# Patient Record
Sex: MALE | Race: WHITE | NOT HISPANIC OR LATINO | URBAN - METROPOLITAN AREA
[De-identification: names, ages, dates, MRNs, and addresses within clinical notes are randomized per-mention and may not be internally consistent; named-entity substitution may affect disease eponyms.]

---

## 2017-06-05 ENCOUNTER — OUTPATIENT (OUTPATIENT)
Dept: OUTPATIENT SERVICES | Facility: HOSPITAL | Age: 27
LOS: 1 days | End: 2017-06-05
Payer: COMMERCIAL

## 2017-06-05 VITALS
WEIGHT: 199.08 LBS | SYSTOLIC BLOOD PRESSURE: 128 MMHG | HEART RATE: 64 BPM | DIASTOLIC BLOOD PRESSURE: 79 MMHG | TEMPERATURE: 98 F | RESPIRATION RATE: 18 BRPM | HEIGHT: 68 IN

## 2017-06-05 DIAGNOSIS — D21.21 BENIGN NEOPLASM OF CONNECTIVE AND OTHER SOFT TISSUE OF RIGHT LOWER LIMB, INCLUDING HIP: ICD-10-CM

## 2017-06-05 DIAGNOSIS — D21.4 BENIGN NEOPLASM OF CONNECTIVE AND OTHER SOFT TISSUE OF ABDOMEN: ICD-10-CM

## 2017-06-05 DIAGNOSIS — Z01.818 ENCOUNTER FOR OTHER PREPROCEDURAL EXAMINATION: ICD-10-CM

## 2017-06-05 DIAGNOSIS — Z90.89 ACQUIRED ABSENCE OF OTHER ORGANS: Chronic | ICD-10-CM

## 2017-06-05 DIAGNOSIS — D17.23 BENIGN LIPOMATOUS NEOPLASM OF SKIN AND SUBCUTANEOUS TISSUE OF RIGHT LEG: ICD-10-CM

## 2017-06-05 DIAGNOSIS — Z98.89 OTHER SPECIFIED POSTPROCEDURAL STATES: Chronic | ICD-10-CM

## 2017-06-05 LAB
HCT VFR BLD CALC: 44 % — SIGNIFICANT CHANGE UP (ref 39–50)
HGB BLD-MCNC: 15 G/DL — SIGNIFICANT CHANGE UP (ref 13–17)
MCHC RBC-ENTMCNC: 29.7 PG — SIGNIFICANT CHANGE UP (ref 27–34)
MCHC RBC-ENTMCNC: 34 GM/DL — SIGNIFICANT CHANGE UP (ref 32–36)
MCV RBC AUTO: 87.3 FL — SIGNIFICANT CHANGE UP (ref 80–100)
PLATELET # BLD AUTO: 169 K/UL — SIGNIFICANT CHANGE UP (ref 150–400)
RBC # BLD: 5.04 M/UL — SIGNIFICANT CHANGE UP (ref 4.2–5.8)
RBC # FLD: 12.3 % — SIGNIFICANT CHANGE UP (ref 10.3–14.5)
WBC # BLD: 8.4 K/UL — SIGNIFICANT CHANGE UP (ref 3.8–10.5)
WBC # FLD AUTO: 8.4 K/UL — SIGNIFICANT CHANGE UP (ref 3.8–10.5)

## 2017-06-05 PROCEDURE — 85027 COMPLETE CBC AUTOMATED: CPT

## 2017-06-05 PROCEDURE — 87389 HIV-1 AG W/HIV-1&-2 AB AG IA: CPT

## 2017-06-05 PROCEDURE — G0463: CPT

## 2017-06-05 NOTE — H&P PST ADULT - ASSESSMENT
25 y/o healthy male with the c/o of lipomas on the right thigh and one on the abdominal wall, now scheduled to have them removed. Pre op testing  today. No medical eval necessary.

## 2017-06-05 NOTE — H&P PST ADULT - RS GEN PE MLT RESP DETAILS PC
breath sounds equal/normal/clear to auscultation bilaterally/respirations non-labored/good air movement/airway patent

## 2017-06-05 NOTE — H&P PST ADULT - NSANTHOSAYNRD_GEN_A_CORE
No. GONZALO screening performed.  STOP BANG Legend: 0-2 = LOW Risk; 3-4 = INTERMEDIATE Risk; 5-8 = HIGH Risk

## 2017-06-05 NOTE — H&P PST ADULT - PSH
H/O removal of cyst  lipoma removal 2013 H/O removal of cyst  lipoma removal 2013  S/P tonsillectomy

## 2017-06-05 NOTE — H&P PST ADULT - HISTORY OF PRESENT ILLNESS
27 y/o male with c/o small lumps on the abdomen and both thighs. Denies any pain or discomfort, was seen by Dr Knowles, advised to have excision. 25 y/o male with c/o small lumps on the abdomen and right thigh. Denies any pain or discomfort, was seen by Dr Knowles, advised to have excision of all 3 lumps. Had similar surgery in 2013. Denies any pain , however the one on the abdomen bothers him when wearing the vest (). Pre testing today.

## 2017-06-06 ENCOUNTER — TRANSCRIPTION ENCOUNTER (OUTPATIENT)
Age: 27
End: 2017-06-06

## 2017-06-06 LAB — HIV 1+2 AB+HIV1 P24 AG SERPL QL IA: SIGNIFICANT CHANGE UP

## 2017-06-06 RX ORDER — SODIUM CHLORIDE 9 MG/ML
1000 INJECTION, SOLUTION INTRAVENOUS
Qty: 0 | Refills: 0 | Status: DISCONTINUED | OUTPATIENT
Start: 2017-06-07 | End: 2017-06-07

## 2017-06-07 ENCOUNTER — OUTPATIENT (OUTPATIENT)
Dept: OUTPATIENT SERVICES | Facility: HOSPITAL | Age: 27
LOS: 1 days | Discharge: HOME | End: 2017-06-07
Payer: COMMERCIAL

## 2017-06-07 VITALS
SYSTOLIC BLOOD PRESSURE: 118 MMHG | RESPIRATION RATE: 12 BRPM | HEIGHT: 68 IN | TEMPERATURE: 98 F | OXYGEN SATURATION: 95 % | HEART RATE: 65 BPM | DIASTOLIC BLOOD PRESSURE: 61 MMHG | WEIGHT: 199.08 LBS

## 2017-06-07 VITALS
DIASTOLIC BLOOD PRESSURE: 59 MMHG | HEART RATE: 73 BPM | SYSTOLIC BLOOD PRESSURE: 125 MMHG | RESPIRATION RATE: 16 BRPM | TEMPERATURE: 98 F | OXYGEN SATURATION: 98 %

## 2017-06-07 DIAGNOSIS — D21.4 BENIGN NEOPLASM OF CONNECTIVE AND OTHER SOFT TISSUE OF ABDOMEN: ICD-10-CM

## 2017-06-07 DIAGNOSIS — D21.21 BENIGN NEOPLASM OF CONNECTIVE AND OTHER SOFT TISSUE OF RIGHT LOWER LIMB, INCLUDING HIP: ICD-10-CM

## 2017-06-07 DIAGNOSIS — Z90.89 ACQUIRED ABSENCE OF OTHER ORGANS: Chronic | ICD-10-CM

## 2017-06-07 DIAGNOSIS — Z98.89 OTHER SPECIFIED POSTPROCEDURAL STATES: Chronic | ICD-10-CM

## 2017-06-07 DIAGNOSIS — Z01.818 ENCOUNTER FOR OTHER PREPROCEDURAL EXAMINATION: ICD-10-CM

## 2017-06-07 PROCEDURE — 88304 TISSUE EXAM BY PATHOLOGIST: CPT | Mod: 26

## 2017-06-07 PROCEDURE — 27339 EXC THIGH/KNEE TUM DEP 5CM/>: CPT

## 2017-06-07 PROCEDURE — 22901 EXC ABDL TUM DEEP 5 CM/>: CPT

## 2017-06-07 PROCEDURE — 88304 TISSUE EXAM BY PATHOLOGIST: CPT

## 2017-06-07 RX ORDER — SODIUM CHLORIDE 9 MG/ML
1000 INJECTION, SOLUTION INTRAVENOUS
Qty: 0 | Refills: 0 | Status: DISCONTINUED | OUTPATIENT
Start: 2017-06-07 | End: 2017-06-07

## 2017-06-07 RX ORDER — HYDROMORPHONE HYDROCHLORIDE 2 MG/ML
0.5 INJECTION INTRAMUSCULAR; INTRAVENOUS; SUBCUTANEOUS
Qty: 0 | Refills: 0 | Status: DISCONTINUED | OUTPATIENT
Start: 2017-06-07 | End: 2017-06-07

## 2017-06-07 RX ORDER — MEPERIDINE HYDROCHLORIDE 50 MG/ML
12.5 INJECTION INTRAMUSCULAR; INTRAVENOUS; SUBCUTANEOUS
Qty: 0 | Refills: 0 | Status: DISCONTINUED | OUTPATIENT
Start: 2017-06-07 | End: 2017-06-07

## 2017-06-07 RX ORDER — OXYCODONE HYDROCHLORIDE 5 MG/1
5 TABLET ORAL EVERY 4 HOURS
Qty: 0 | Refills: 0 | Status: DISCONTINUED | OUTPATIENT
Start: 2017-06-07 | End: 2017-06-07

## 2017-06-07 RX ORDER — HYDROCODONE BITARTRATE AND ACETAMINOPHEN 7.5; 325 MG/15ML; MG/15ML
1 SOLUTION ORAL
Qty: 10 | Refills: 0
Start: 2017-06-07

## 2017-06-07 RX ORDER — ONDANSETRON 8 MG/1
4 TABLET, FILM COATED ORAL ONCE
Qty: 0 | Refills: 0 | Status: DISCONTINUED | OUTPATIENT
Start: 2017-06-07 | End: 2017-06-07

## 2017-06-07 RX ORDER — METOCLOPRAMIDE HCL 10 MG
10 TABLET ORAL ONCE
Qty: 0 | Refills: 0 | Status: DISCONTINUED | OUTPATIENT
Start: 2017-06-07 | End: 2017-06-07

## 2017-06-07 RX ORDER — CEFAZOLIN SODIUM 1 G
1000 VIAL (EA) INJECTION ONCE
Qty: 0 | Refills: 0 | Status: COMPLETED | OUTPATIENT
Start: 2017-06-07 | End: 2017-06-07

## 2017-06-07 RX ADMIN — SODIUM CHLORIDE 40 MILLILITER(S): 9 INJECTION, SOLUTION INTRAVENOUS at 13:20

## 2017-06-07 NOTE — BRIEF OPERATIVE NOTE - POST-OP DX
Soft tissue mass  06/07/2017  Right Anterior Thigh Mass. Right Posterior Thigh Mass, Left Abdominal Wall Mass  Active  Edy Knowles

## 2017-06-07 NOTE — BRIEF OPERATIVE NOTE - COMMENTS
Excision Right Anterior Thigh Mass. Excison Right Posterior Thigh Mass, Excision Left Abdominal Wall Mass

## 2017-06-07 NOTE — ASU DISCHARGE PLAN (ADULT/PEDIATRIC). - ASU FOLLOWUP
888 or go to the nearest Emergency Room[phone]

## 2017-06-07 NOTE — ASU DISCHARGE PLAN (ADULT/PEDIATRIC). - MEDICATION SUMMARY - MEDICATIONS TO TAKE
I will START or STAY ON the medications listed below when I get home from the hospital:    acetaminophen-hydrocodone 325 mg-5 mg oral tablet  -- 1 tab(s) by mouth every 6 hours, As Needed -for severe pain MDD:4  -- Caution federal law prohibits the transfer of this drug to any person other  than the person for whom it was prescribed.  May cause drowsiness.  Alcohol may intensify this effect.  Use care when operating dangerous machinery.  This product contains acetaminophen.  Do not use  with any other product containing acetaminophen to prevent possible liver damage.  Using more of this medication than prescribed may cause serious breathing problems.    -- Indication: For pain

## 2017-06-09 LAB — SURGICAL PATHOLOGY FINAL REPORT - CH: SIGNIFICANT CHANGE UP

## 2017-06-12 DIAGNOSIS — D21.21 BENIGN NEOPLASM OF CONNECTIVE AND OTHER SOFT TISSUE OF RIGHT LOWER LIMB, INCLUDING HIP: ICD-10-CM

## 2017-06-12 DIAGNOSIS — F17.210 NICOTINE DEPENDENCE, CIGARETTES, UNCOMPLICATED: ICD-10-CM

## 2020-08-31 ENCOUNTER — TRANSCRIPTION ENCOUNTER (OUTPATIENT)
Age: 30
End: 2020-08-31

## 2022-02-09 ENCOUNTER — EMERGENCY (EMERGENCY)
Facility: HOSPITAL | Age: 32
LOS: 1 days | Discharge: HOME | End: 2022-02-09
Attending: EMERGENCY MEDICINE
Payer: COMMERCIAL

## 2022-02-09 VITALS
HEIGHT: 68 IN | DIASTOLIC BLOOD PRESSURE: 95 MMHG | RESPIRATION RATE: 18 BRPM | WEIGHT: 197.98 LBS | TEMPERATURE: 98 F | SYSTOLIC BLOOD PRESSURE: 146 MMHG | HEART RATE: 82 BPM | OXYGEN SATURATION: 97 %

## 2022-02-09 VITALS
OXYGEN SATURATION: 99 % | RESPIRATION RATE: 18 BRPM | HEART RATE: 78 BPM | DIASTOLIC BLOOD PRESSURE: 92 MMHG | TEMPERATURE: 98 F | SYSTOLIC BLOOD PRESSURE: 146 MMHG

## 2022-02-09 DIAGNOSIS — Z90.89 ACQUIRED ABSENCE OF OTHER ORGANS: Chronic | ICD-10-CM

## 2022-02-09 DIAGNOSIS — Z98.89 OTHER SPECIFIED POSTPROCEDURAL STATES: Chronic | ICD-10-CM

## 2022-02-09 PROCEDURE — 73030 X-RAY EXAM OF SHOULDER: CPT

## 2022-02-09 PROCEDURE — 73110 X-RAY EXAM OF WRIST: CPT

## 2022-02-09 PROCEDURE — 73110 X-RAY EXAM OF WRIST: CPT | Mod: 26,RT

## 2022-02-09 PROCEDURE — 73020 X-RAY EXAM OF SHOULDER: CPT

## 2022-02-09 PROCEDURE — 73630 X-RAY EXAM OF FOOT: CPT

## 2022-02-09 PROCEDURE — 73030 X-RAY EXAM OF SHOULDER: CPT | Mod: 26,RT

## 2022-02-09 PROCEDURE — 99284 EMERGENCY DEPT VISIT MOD MDM: CPT

## 2022-02-09 PROCEDURE — 99285 EMERGENCY DEPT VISIT HI MDM: CPT | Mod: 25

## 2022-02-09 PROCEDURE — 99053 MED SERV 10PM-8AM 24 HR FAC: CPT

## 2022-02-09 PROCEDURE — 73630 X-RAY EXAM OF FOOT: CPT | Mod: 26,RT

## 2022-02-09 RX ORDER — ACETAMINOPHEN 500 MG
975 TABLET ORAL ONCE
Refills: 0 | Status: COMPLETED | OUTPATIENT
Start: 2022-02-09 | End: 2022-02-09

## 2022-02-09 RX ORDER — LIDOCAINE 4 G/100G
1 CREAM TOPICAL ONCE
Refills: 0 | Status: COMPLETED | OUTPATIENT
Start: 2022-02-09 | End: 2022-02-09

## 2022-02-09 RX ORDER — METHOCARBAMOL 500 MG/1
750 TABLET, FILM COATED ORAL ONCE
Refills: 0 | Status: COMPLETED | OUTPATIENT
Start: 2022-02-09 | End: 2022-02-09

## 2022-02-09 RX ORDER — IBUPROFEN 200 MG
600 TABLET ORAL ONCE
Refills: 0 | Status: COMPLETED | OUTPATIENT
Start: 2022-02-09 | End: 2022-02-09

## 2022-02-09 RX ADMIN — METHOCARBAMOL 750 MILLIGRAM(S): 500 TABLET, FILM COATED ORAL at 04:14

## 2022-02-09 RX ADMIN — Medication 975 MILLIGRAM(S): at 04:13

## 2022-02-09 RX ADMIN — Medication 600 MILLIGRAM(S): at 04:13

## 2022-02-09 RX ADMIN — LIDOCAINE 1 PATCH: 4 CREAM TOPICAL at 04:14
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
## 2022-02-09 NOTE — ED PROVIDER NOTE - NSFOLLOWUPCLINICS_GEN_ALL_ED_FT
[facility] Orthopedic Surgery  Orthopedic Surgery  [address], 3rd & 4th [place] [address]  Phone: [phone]  Fax:
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
## 2022-02-09 NOTE — ED PROVIDER NOTE - PATIENT PORTAL LINK FT
You can access the FollowMyHealth Patient Portal offered by [facility] by registering at the following website: [email or web address]. By joining [facility]’s FollowMyHealth portal, you will also be able to view your health information using other applications (apps) compatible with our system.
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
## 2022-02-09 NOTE — ED PROVIDER NOTE - MUSCULOSKELETAL, MLM
Spine appears normal, no midline c/t/l spine ttp, stable pelvis, with R shoulder ttp specifically over the coricoid process, able to range shoulder but with discomfort. posterior ttp of the R wrist, no swelling or bruising noted. neurovascularly intact. no medial or lateral malleous ttp of the R ankle. ttp over the navicular.

## 2022-02-09 NOTE — ED PROVIDER NOTE - PROGRESS NOTE DETAILS
kateryna hale pgy3: negative XR, reports feeling better at this time and would like to be discharged. will dc home with supportive care instructions and ortho follow up.

## 2022-04-05 ENCOUNTER — APPOINTMENT (OUTPATIENT)
Dept: MRI IMAGING | Facility: CLINIC | Age: 32
End: 2022-04-05
Payer: OTHER MISCELLANEOUS

## 2022-04-05 PROBLEM — Z00.00 ENCOUNTER FOR PREVENTIVE HEALTH EXAMINATION: Status: ACTIVE | Noted: 2022-04-05

## 2022-04-05 PROCEDURE — 99072 ADDL SUPL MATRL&STAF TM PHE: CPT

## 2022-04-05 PROCEDURE — 73721 MRI JNT OF LWR EXTRE W/O DYE: CPT | Mod: RT

## 2022-04-13 ENCOUNTER — APPOINTMENT (OUTPATIENT)
Dept: ORTHOPEDIC SURGERY | Facility: CLINIC | Age: 32
End: 2022-04-13
Payer: OTHER MISCELLANEOUS

## 2022-04-13 VITALS — BODY MASS INDEX: 29.1 KG/M2 | HEIGHT: 68 IN | WEIGHT: 192 LBS

## 2022-04-13 DIAGNOSIS — Z78.9 OTHER SPECIFIED HEALTH STATUS: ICD-10-CM

## 2022-04-13 PROCEDURE — 99214 OFFICE O/P EST MOD 30 MIN: CPT

## 2022-04-13 PROCEDURE — 99072 ADDL SUPL MATRL&STAF TM PHE: CPT

## 2022-04-13 NOTE — DATA REVIEWED
[MRI] : MRI [Right] : of the right [Shoulder] : shoulder [FreeTextEntry1] : 2/22/22 [facility]: 1. Mild AC joint arthrosis with capsule hypertrophy. Lateral downsloping acromion which may be causing impingement.\par 2. Tendinopathy and bursal surface fraying of the supraspinatus and infraspinatus tendons.\par 3. Tear of the superior and anterior labrum.

## 2022-04-13 NOTE — PHYSICAL EXAM
[de-identified] : Constitutional: The general appearance of the patient is well developed, well nourished, no deformities and well groomed. Normal \par \par Gait: Gait and function is as follows: normal gait. \par \par Skin: Head and neck visualized skin is normal. Left upper extremity visualized skin is normal. Right upper extremity visualized skin is normal. Thoracic Skin of the thoracic spine shows visualized skin is normal. \par \par Cardiovascular: palpable radial pulse bilaterally, good capillary refill in digits of the bilateral upper extremities and no temperature or color changes in the bilateral upper extremities. \par \par Lymphatic: Normal Palpation of lymph nodes in the cervical. \par \par Neurologic: fine motor control in the bilateral upper extremities is intact. Deep Tendon Reflexes in Upper and Lower Extremities Negative Kelsey's in the bilateral upper extremities. The patient is oriented to time, place and person. Sensation to light touch intact in the bilateral upper extremities. Mood and Affect is normal. \par \par Right Shoulder: Inspection of the shoulder/upper arm is as follows: no scapula winging, no biceps deformity and no AC joint deformity. Palpation of the shoulder/upper arm is as follows: There is tenderness at the proximal biceps tendon. Range of motion of the shoulder is as follows: Pain with internal rotation, external rotation, abduction and forward flexion. Strength of the shoulder is as follows: Supraspinatus 4/5. External Rotation 4/5. Internal Rotation 4/5. Deltoid 5/5 Ligament Stability and Special Tests of the shoulder is as follows: Neer test is positive. Shannon' test is positive. Speed's test is positive. \par \par Left Shoulder: Inspection of the shoulder/upper arm is as follows: There is a full, pain-free, stable range of motion of the shoulder with normal strength and no tenderness to palpation. \par \par Neck: \par Inspection / Palpation of the cervical spine is as follows: There is a full, pain free, stable range of motion of the cervical spine with normal tone and no tenderness to palpation. \par \par Back, including spine: Inspection / Palpation of the thoracic/lumbar spine is as follows: There is a full, pain free, stable range of motion of the thoracic spine with a normal tone and not tenderness to palpation..\par
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
## 2022-04-14 ENCOUNTER — APPOINTMENT (OUTPATIENT)
Dept: ORTHOPEDIC SURGERY | Facility: CLINIC | Age: 32
End: 2022-04-14
Payer: OTHER MISCELLANEOUS

## 2022-04-14 VITALS — BODY MASS INDEX: 29.1 KG/M2 | WEIGHT: 192 LBS | HEIGHT: 68 IN

## 2022-04-14 DIAGNOSIS — S93.491A SPRAIN OF OTHER LIGAMENT OF RIGHT ANKLE, INITIAL ENCOUNTER: ICD-10-CM

## 2022-04-14 PROCEDURE — 99214 OFFICE O/P EST MOD 30 MIN: CPT | Mod: 1L

## 2022-04-14 PROCEDURE — 99072 ADDL SUPL MATRL&STAF TM PHE: CPT

## 2022-04-14 PROCEDURE — 99214 OFFICE O/P EST MOD 30 MIN: CPT

## 2022-04-14 PROCEDURE — 99072 ADDL SUPL MATRL&STAF TM PHE: CPT | Mod: 1L

## 2022-04-14 NOTE — WORK
[Partial] : partial [Does not reveal pre-existing condition(s) that may affect treatment/prognosis] : does not reveal pre-existing condition(s) that may affect treatment/prognosis [Can return to work without limitations on ______] : can return to work without limitations on [unfilled] [N/A] : : Not Applicable [Patient] : patient [No Rx restrictions] : No Rx restrictions. [I provided the services listed above] :  I provided the services listed above. [FreeTextEntry1] : good

## 2022-04-14 NOTE — DATA REVIEWED
[MRI] : MRI [Right] : of the right [I reviewed the films/CD and additionally noted] : I reviewed the films/CD and additionally noted [FreeTextEntry1] : healing posterior mall fx - peroneal tear. syndesmotic sprain
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
## 2022-04-14 NOTE — ASSESSMENT
[FreeTextEntry1] : wbat\par PT\par nsaids prn\par brace\par working full time\par discussed surgical treatment - will monitor response to PT\par f/up 6 wks

## 2022-04-14 NOTE — PHYSICAL EXAM
[Right] : right foot and ankle [NL (40)] : plantar flexion 40 degrees [NL 30)] : inversion 30 degrees [5___] : eversion 5[unfilled]/5 [2+] : dorsalis pedis pulse: 2+ [] : patient ambulates without assistive device [de-identified] : popping on eversion [de-identified] : eversion 15 degrees [TWNoteComboBox7] : dorsiflexion 15 degrees

## 2022-06-01 ENCOUNTER — APPOINTMENT (OUTPATIENT)
Dept: ORTHOPEDIC SURGERY | Facility: CLINIC | Age: 32
End: 2022-06-01
Payer: OTHER MISCELLANEOUS

## 2022-06-01 VITALS — BODY MASS INDEX: 29.1 KG/M2 | HEIGHT: 68 IN | WEIGHT: 192 LBS

## 2022-06-01 PROCEDURE — 99214 OFFICE O/P EST MOD 30 MIN: CPT

## 2022-06-01 PROCEDURE — 99072 ADDL SUPL MATRL&STAF TM PHE: CPT

## 2022-06-01 NOTE — ASSESSMENT
[FreeTextEntry1] : wbat\par again rec PT\par nsaids prn\par brace for activity\par working full time\par discussed surgical treatment - will monitor response to PT\par f/up 6 wks
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
## 2022-07-20 ENCOUNTER — APPOINTMENT (OUTPATIENT)
Dept: ORTHOPEDIC SURGERY | Facility: CLINIC | Age: 32
End: 2022-07-20

## 2022-07-20 VITALS — HEIGHT: 68 IN | WEIGHT: 192 LBS | BODY MASS INDEX: 29.1 KG/M2

## 2022-07-20 PROCEDURE — 99213 OFFICE O/P EST LOW 20 MIN: CPT

## 2022-07-20 PROCEDURE — 99072 ADDL SUPL MATRL&STAF TM PHE: CPT
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
## 2022-07-20 NOTE — PHYSICAL EXAM
[Right] : right foot and ankle [NL (40)] : plantar flexion 40 degrees [NL 30)] : inversion 30 degrees [5___] : eversion 5[unfilled]/5 [2+] : dorsalis pedis pulse: 2+ [NL (20)] : eversion 20 degrees [] : negative anterior drawer at ankle [FreeTextEntry8] : improved [de-identified] : popping on eversion [de-identified] : eversion 15 degrees [TWNoteComboBox7] : dorsiflexion 15 degrees

## 2022-07-20 NOTE — ASSESSMENT
[FreeTextEntry1] : wbat\par continue PT\par nsaids prn\par brace for activity\par working full time\par f/up 8 wks

## 2022-08-22 NOTE — H&P PST ADULT - NEGATIVE GENERAL SYMPTOMS
checked, Clonazepam refilled (#90,0R).  Upcoming appt with INDIRA Neves 9-19 no fever/no weight loss/no chills/no anorexia/no sweating

## 2022-09-21 ENCOUNTER — APPOINTMENT (OUTPATIENT)
Dept: ORTHOPEDIC SURGERY | Facility: CLINIC | Age: 32
End: 2022-09-21

## 2022-09-21 VITALS — HEIGHT: 68 IN | WEIGHT: 192 LBS | BODY MASS INDEX: 29.1 KG/M2

## 2022-09-21 DIAGNOSIS — M76.71 PERONEAL TENDINITIS, RIGHT LEG: ICD-10-CM

## 2022-09-21 PROCEDURE — 99072 ADDL SUPL MATRL&STAF TM PHE: CPT

## 2022-09-21 PROCEDURE — 99213 OFFICE O/P EST LOW 20 MIN: CPT

## 2022-09-21 NOTE — PHYSICAL EXAM
[Right] : right foot and ankle [NL (40)] : plantar flexion 40 degrees [NL 30)] : inversion 30 degrees [NL (20)] : eversion 20 degrees [5___] : eversion 5[unfilled]/5 [2+] : dorsalis pedis pulse: 2+ [] : non-antalgic [FreeTextEntry8] : improved [de-identified] : popping on eversion [TWNoteComboBox7] : dorsiflexion 15 degrees
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
## 2022-11-21 ENCOUNTER — APPOINTMENT (OUTPATIENT)
Dept: SURGERY | Facility: CLINIC | Age: 32
End: 2022-11-21

## 2022-11-21 VITALS
WEIGHT: 200 LBS | OXYGEN SATURATION: 97 % | DIASTOLIC BLOOD PRESSURE: 85 MMHG | HEART RATE: 78 BPM | SYSTOLIC BLOOD PRESSURE: 132 MMHG | TEMPERATURE: 98.2 F | BODY MASS INDEX: 30.41 KG/M2

## 2022-11-21 PROCEDURE — 99204 OFFICE O/P NEW MOD 45 MIN: CPT

## 2022-12-23 ENCOUNTER — OUTPATIENT (OUTPATIENT)
Dept: OUTPATIENT SERVICES | Facility: HOSPITAL | Age: 32
LOS: 1 days | End: 2022-12-23
Payer: COMMERCIAL

## 2022-12-23 VITALS
SYSTOLIC BLOOD PRESSURE: 126 MMHG | RESPIRATION RATE: 16 BRPM | HEART RATE: 69 BPM | TEMPERATURE: 97 F | WEIGHT: 225.09 LBS | DIASTOLIC BLOOD PRESSURE: 78 MMHG | OXYGEN SATURATION: 97 %

## 2022-12-23 DIAGNOSIS — Z01.818 ENCOUNTER FOR OTHER PREPROCEDURAL EXAMINATION: ICD-10-CM

## 2022-12-23 DIAGNOSIS — R22.2 LOCALIZED SWELLING, MASS AND LUMP, TRUNK: ICD-10-CM

## 2022-12-23 DIAGNOSIS — R22.41 LOCALIZED SWELLING, MASS AND LUMP, RIGHT LOWER LIMB: ICD-10-CM

## 2022-12-23 DIAGNOSIS — Z90.89 ACQUIRED ABSENCE OF OTHER ORGANS: Chronic | ICD-10-CM

## 2022-12-23 DIAGNOSIS — Z98.89 OTHER SPECIFIED POSTPROCEDURAL STATES: Chronic | ICD-10-CM

## 2022-12-23 DIAGNOSIS — Z98.890 OTHER SPECIFIED POSTPROCEDURAL STATES: Chronic | ICD-10-CM

## 2022-12-23 LAB
HCT VFR BLD CALC: 40.8 % — SIGNIFICANT CHANGE UP (ref 39–50)
HGB BLD-MCNC: 13.6 G/DL — SIGNIFICANT CHANGE UP (ref 13–17)
MCHC RBC-ENTMCNC: 28.9 PG — SIGNIFICANT CHANGE UP (ref 27–34)
MCHC RBC-ENTMCNC: 33.3 GM/DL — SIGNIFICANT CHANGE UP (ref 32–36)
MCV RBC AUTO: 86.8 FL — SIGNIFICANT CHANGE UP (ref 80–100)
NRBC # BLD: 0 /100 WBCS — SIGNIFICANT CHANGE UP (ref 0–0)
PLATELET # BLD AUTO: 150 K/UL — SIGNIFICANT CHANGE UP (ref 150–400)
RBC # BLD: 4.7 M/UL — SIGNIFICANT CHANGE UP (ref 4.2–5.8)
RBC # FLD: 13.2 % — SIGNIFICANT CHANGE UP (ref 10.3–14.5)
WBC # BLD: 5.83 K/UL — SIGNIFICANT CHANGE UP (ref 3.8–10.5)
WBC # FLD AUTO: 5.83 K/UL — SIGNIFICANT CHANGE UP (ref 3.8–10.5)

## 2022-12-23 PROCEDURE — 36415 COLL VENOUS BLD VENIPUNCTURE: CPT

## 2022-12-23 PROCEDURE — G0463: CPT

## 2022-12-23 PROCEDURE — 85027 COMPLETE CBC AUTOMATED: CPT

## 2022-12-23 NOTE — H&P PST ADULT - NSICDXPASTMEDICALHX_GEN_ALL_CORE_FT
PAST MEDICAL HISTORY:  Lipoma      PAST MEDICAL HISTORY:  Lipoma     Localized swelling, mass and lump, right lower limb     Localized swelling, mass and lump, trunk

## 2022-12-23 NOTE — H&P PST ADULT - GENERAL COMMENTS
Pt denies having traveled outside the country for the last 14 days. Pt denies having had the COVID19 infection and denies COVID19 positive contacts within the last 14 days. Denies recent illness in the last 2 weeks.

## 2022-12-23 NOTE — H&P PST ADULT - PROBLEM SELECTOR PLAN 1
Excision of lipomas, 2 on the right thigh and one on the left abdominal wall Excision substernal mass, right upper abdominal mass on 1/6/2023.

## 2022-12-23 NOTE — H&P PST ADULT - HISTORY OF PRESENT ILLNESS
27 y/o male with c/o small lumps on the abdomen and right thigh. Denies any pain or discomfort, was seen by Dr Knowles, advised to have excision of all 3 lumps. Had similar surgery in 2013. Denies any pain , however the one on the abdomen bothers him when wearing the vest (). Pre testing today.    33 y/o male with PMH of lipomas, s/p removal of multiple lipomas in 2013 & 2017. Pt complaining of having lipomas to substernal area, right upper abdomen and right posterior upper leg. Pt complaining of  31 y/o male with PMH of lipomas, s/p removal of multiple lipomas in 2013 & 2017 here for PST. Pt complaining of having lipomas to substernal area, right upper abdomen and right posterior upper leg. Pt complaining of pain of lipomas and rates pain to be 3/10 but denies taking po analgesia. Pt electing for excision substernal mass, right upper abdominal mass and right upper leg mass on 1/6/2023.

## 2022-12-23 NOTE — H&P PST ADULT - NSICDXPASTSURGICALHX_GEN_ALL_CORE_FT
PAST SURGICAL HISTORY:  H/O removal of cyst lipoma removal 2013    S/P tonsillectomy      PAST SURGICAL HISTORY:  S/P excision of lipoma     S/P tonsillectomy

## 2022-12-23 NOTE — H&P PST ADULT - PROBLEM SELECTOR PLAN 3
No medical clearance needed as per surgeon. CBC ordered. Pre-op instructions and surgical scrubs given and pt verbalized understanding. Made appt for COVID19 PCR swab at a [facility] testing site ([place]) 3 days before surgery and Rx for COVID19 PCR swab given to patient.

## 2022-12-23 NOTE — H&P PST ADULT - NEGATIVE CARDIOVASCULAR SYMPTOMS
Detail Level: Detailed Add 03724 Cpt? (Important Note: In 2017 The Use Of 93990 Is Being Tracked By Cms To Determine Future Global Period Reimbursement For Global Periods): yes Wound Evaluated By: Julieta Lares Additional Comments: Well healing surgical site, mild scaling, no edema, no tenderness no chest pain/no palpitations/no dyspnea on exertion/no orthopnea

## 2022-12-23 NOTE — H&P PST ADULT - ASSESSMENT
25 y/o healthy male with the c/o of lipomas on the right thigh and one on the abdominal wall, now scheduled to have them removed. Pre op testing  today. No medical eval necessary. 33 y/o male with localized swelling mass and lump right lower limb and trunk.

## 2022-12-23 NOTE — H&P PST ADULT - NSCAFFEAMTFREQ_GEN_ALL_CORE_SD
----- Message from Kameron Donato MD sent at 4/18/2018  3:14 PM CDT -----  After reviewing the notes - hx of lipitor with adverse reaction.  Increase the pravastatin to 80 mg.    ----- Message -----  From: Kameron Donato MD  Sent: 4/18/2018   3:12 PM  To: Tanmay Melo Staff    a1c high on metformin 500 BID  Lipid high on pravastatin; hx of lipitor would go back to lipitor.    Please call pt - her diabetes is high.  Increase the metformin - take 2 pills twice a day.  Her cholesterol was high. Change pravastatin to lipitor (atorvastatin) 40 mg daily.  Will update prescriptions to pharmacy.    Follow up fasting in 3 months.     occasional use

## 2022-12-23 NOTE — H&P PST ADULT - SKIN COMMENTS
one small lump on left abdominal wall, 2 on right thigh (+) tender lipomas to mid upper abdomen, right mid abdomen and right posterior leg - no erythema, no drainage (+) tender lipomas to substernal (5.5cm), right subcostal (5.5cm) and right posterior leg (6cm) - mobile, no erythema, no drainage

## 2022-12-23 NOTE — H&P PST ADULT - NSICDXFAMILYHX_GEN_ALL_CORE_FT
FAMILY HISTORY:  Grandparent  Still living? No  Family history of breast cancer, Age at diagnosis: Age Unknown

## 2022-12-28 PROBLEM — R22.41 LOCALIZED SWELLING, MASS AND LUMP, RIGHT LOWER LIMB: Chronic | Status: ACTIVE | Noted: 2022-12-23

## 2022-12-28 PROBLEM — R22.2 LOCALIZED SWELLING, MASS AND LUMP, TRUNK: Chronic | Status: ACTIVE | Noted: 2022-12-23

## 2023-01-04 ENCOUNTER — APPOINTMENT (OUTPATIENT)
Dept: ORTHOPEDIC SURGERY | Facility: CLINIC | Age: 33
End: 2023-01-04

## 2023-01-05 ENCOUNTER — TRANSCRIPTION ENCOUNTER (OUTPATIENT)
Age: 33
End: 2023-01-05

## 2023-01-05 NOTE — ASU PATIENT PROFILE, ADULT - NSICDXPASTMEDICALHX_GEN_ALL_CORE_FT
PAST MEDICAL HISTORY:  Lipoma     Localized swelling, mass and lump, right lower limb     Localized swelling, mass and lump, trunk

## 2023-01-05 NOTE — ASU PATIENT PROFILE, ADULT - SPIRITUAL CULTURAL, CURRENT SITUATION, PROFILE
Placed on telemetry monitor- SB with rate of 58.       Xu Muse RN  04/03/21 2376
Returned to room from radiology. Assisted off cot and to wheelchair to use the bathroom. Tolerated well. When asked if patient needed any pain relief medication for headache she declined at this time. Returned to room and assisted back to cot. Ice pack returned to the right hand and right side of head per patient request.  Family in room. Lights turned off for comfort. Call light in reach. Will monitor.       Selma Cosby RN  04/03/21 1800
none

## 2023-01-05 NOTE — ASU PATIENT PROFILE, ADULT - FALL HARM RISK - UNIVERSAL INTERVENTIONS
Bed in lowest position, wheels locked, appropriate side rails in place/Call bell, personal items and telephone in reach/Instruct patient to call for assistance before getting out of bed or chair/Non-slip footwear when patient is out of bed/[place] to call system/Physically safe environment - no spills, clutter or unnecessary equipment/Purposeful Proactive Rounding/Room/bathroom lighting operational, light cord in reach

## 2023-01-06 ENCOUNTER — APPOINTMENT (OUTPATIENT)
Dept: SURGERY | Facility: HOSPITAL | Age: 33
End: 2023-01-06

## 2023-01-06 ENCOUNTER — OUTPATIENT (OUTPATIENT)
Dept: OUTPATIENT SERVICES | Facility: HOSPITAL | Age: 33
LOS: 1 days | End: 2023-01-06
Payer: COMMERCIAL

## 2023-01-06 ENCOUNTER — TRANSCRIPTION ENCOUNTER (OUTPATIENT)
Age: 33
End: 2023-01-06

## 2023-01-06 ENCOUNTER — RESULT REVIEW (OUTPATIENT)
Age: 33
End: 2023-01-06

## 2023-01-06 VITALS
RESPIRATION RATE: 16 BRPM | TEMPERATURE: 98 F | OXYGEN SATURATION: 98 % | SYSTOLIC BLOOD PRESSURE: 118 MMHG | HEART RATE: 65 BPM | DIASTOLIC BLOOD PRESSURE: 66 MMHG

## 2023-01-06 VITALS
SYSTOLIC BLOOD PRESSURE: 108 MMHG | OXYGEN SATURATION: 96 % | DIASTOLIC BLOOD PRESSURE: 71 MMHG | RESPIRATION RATE: 12 BRPM | WEIGHT: 225.09 LBS | TEMPERATURE: 98 F | HEART RATE: 67 BPM

## 2023-01-06 DIAGNOSIS — R22.2 LOCALIZED SWELLING, MASS AND LUMP, TRUNK: ICD-10-CM

## 2023-01-06 DIAGNOSIS — Z98.890 OTHER SPECIFIED POSTPROCEDURAL STATES: Chronic | ICD-10-CM

## 2023-01-06 DIAGNOSIS — R22.41 LOCALIZED SWELLING, MASS AND LUMP, RIGHT LOWER LIMB: ICD-10-CM

## 2023-01-06 DIAGNOSIS — Z90.89 ACQUIRED ABSENCE OF OTHER ORGANS: Chronic | ICD-10-CM

## 2023-01-06 PROCEDURE — 27632 EXC LEG/ANKLE LES SC 3 CM/>: CPT | Mod: RT

## 2023-01-06 PROCEDURE — 27337 EXC THIGH/KNEE LES SC 3 CM/>: CPT | Mod: 59

## 2023-01-06 PROCEDURE — 21552 EXC NECK LES SC 3 CM/>: CPT | Mod: XS

## 2023-01-06 PROCEDURE — 21556 EXC NECK TUM DEEP < 5 CM: CPT

## 2023-01-06 PROCEDURE — 27339 EXC THIGH/KNEE TUM DEP 5CM/>: CPT

## 2023-01-06 PROCEDURE — 27339 EXC THIGH/KNEE TUM DEP 5CM/>: CPT | Mod: RT

## 2023-01-06 PROCEDURE — 21552 EXC NECK LES SC 3 CM/>: CPT | Mod: 59

## 2023-01-06 PROCEDURE — 88304 TISSUE EXAM BY PATHOLOGIST: CPT

## 2023-01-06 PROCEDURE — 88304 TISSUE EXAM BY PATHOLOGIST: CPT | Mod: 26

## 2023-01-06 RX ORDER — OXYCODONE HYDROCHLORIDE 5 MG/1
1 TABLET ORAL
Qty: 9 | Refills: 0
Start: 2023-01-06 | End: 2023-01-08

## 2023-01-06 RX ORDER — SODIUM CHLORIDE 9 MG/ML
1000 INJECTION, SOLUTION INTRAVENOUS
Refills: 0 | Status: DISCONTINUED | OUTPATIENT
Start: 2023-01-06 | End: 2023-01-06

## 2023-01-06 RX ORDER — IBUPROFEN 200 MG
1 TABLET ORAL
Qty: 21 | Refills: 0
Start: 2023-01-06 | End: 2023-01-12

## 2023-01-06 RX ORDER — CEFAZOLIN SODIUM 1 G
1000 VIAL (EA) INJECTION ONCE
Refills: 0 | Status: COMPLETED | OUTPATIENT
Start: 2023-01-06 | End: 2023-01-06

## 2023-01-06 RX ORDER — ACETAMINOPHEN 500 MG
2 TABLET ORAL
Qty: 42 | Refills: 0
Start: 2023-01-06 | End: 2023-01-12

## 2023-01-06 RX ADMIN — SODIUM CHLORIDE 75 MILLILITER(S): 9 INJECTION, SOLUTION INTRAVENOUS at 08:21

## 2023-01-06 NOTE — ASU DISCHARGE PLAN (ADULT/PEDIATRIC) - CARE PROVIDER_API CALL
Edy Knowles (DO)  Surgery  [address] [facility], [address]  [place], [place] [id number]  Phone: [phone]  Fax: [phone]  Established Patient  Follow Up Time: 2 weeks

## 2023-01-06 NOTE — BRIEF OPERATIVE NOTE - NSICDXBRIEFPREOP_GEN_ALL_CORE_FT
PRE-OP DIAGNOSIS:  Chest mass 06-Jan-2023 09:56:22  Edy Knowles  Leg mass, right 06-Jan-2023 09:56:35  Edy Knowles

## 2023-01-06 NOTE — BRIEF OPERATIVE NOTE - NSICDXBRIEFPOSTOP_GEN_ALL_CORE_FT
POST-OP DIAGNOSIS:  Leg mass, right 06-Jan-2023 09:56:46  Edy Knowles  Chest mass 06-Jan-2023 09:56:43  Edy Knowles

## 2023-01-06 NOTE — BRIEF OPERATIVE NOTE - COMMENTS
-Excision of Substernal Chest Wall Mass  -Excison of Right Chest Wall Mass  -Excison of Right Inner Thigh Mass  -Excision of Right Upper Leg Mass

## 2023-01-09 ENCOUNTER — APPOINTMENT (OUTPATIENT)
Dept: ORTHOPEDIC SURGERY | Facility: CLINIC | Age: 33
End: 2023-01-09
Payer: OTHER MISCELLANEOUS

## 2023-01-09 VITALS — HEIGHT: 68 IN | WEIGHT: 200 LBS | BODY MASS INDEX: 30.31 KG/M2

## 2023-01-09 LAB — SURGICAL PATHOLOGY STUDY: SIGNIFICANT CHANGE UP

## 2023-01-09 PROCEDURE — 99214 OFFICE O/P EST MOD 30 MIN: CPT

## 2023-01-09 PROCEDURE — 99072 ADDL SUPL MATRL&STAF TM PHE: CPT

## 2023-01-09 NOTE — PHYSICAL EXAM
[de-identified] : Constitutional: The general appearance of the patient is well developed, well nourished, no deformities and well groomed. Normal \par \par Gait: Gait and function is as follows: normal gait. \par \par Skin: Head and neck visualized skin is normal. Left upper extremity visualized skin is normal. Right upper extremity visualized skin is normal. Thoracic Skin of the thoracic spine shows visualized skin is normal. \par \par Cardiovascular: palpable radial pulse bilaterally, good capillary refill in digits of the bilateral upper extremities and no temperature or color changes in the bilateral upper extremities. \par \par Lymphatic: Normal Palpation of lymph nodes in the cervical. \par \par Neurologic: fine motor control in the bilateral upper extremities is intact. Deep Tendon Reflexes in Upper and Lower Extremities Negative Kelsey's in the bilateral upper extremities. The patient is oriented to time, place and person. Sensation to light touch intact in the bilateral upper extremities. Mood and Affect is normal. \par \par Right Shoulder: Inspection of the shoulder/upper arm is as follows: no scapula winging, no biceps deformity and no AC joint deformity. Palpation of the shoulder/upper arm is as follows: There is tenderness at the proximal biceps tendon. Range of motion of the shoulder is as follows: Pain with internal rotation, external rotation, abduction and forward flexion. Strength of the shoulder is as follows: Supraspinatus 4/5. External Rotation 4/5. Internal Rotation 4/5. Deltoid 5/5 Ligament Stability and Special Tests of the shoulder is as follows: Neer test is positive. Shannon' test is positive. Speed's test is positive. \par \par Left Shoulder: Inspection of the shoulder/upper arm is as follows: There is a full, pain-free, stable range of motion of the shoulder with normal strength and no tenderness to palpation. \par \par Neck: \par Inspection / Palpation of the cervical spine is as follows: There is a full, pain free, stable range of motion of the cervical spine with normal tone and no tenderness to palpation. \par \par Back, including spine: Inspection / Palpation of the thoracic/lumbar spine is as follows: There is a full, pain free, stable range of motion of the thoracic spine with a normal tone and not tenderness to palpation..\par
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
## 2023-01-25 ENCOUNTER — APPOINTMENT (OUTPATIENT)
Dept: ORTHOPEDIC SURGERY | Facility: CLINIC | Age: 33
End: 2023-01-25
Payer: OTHER MISCELLANEOUS

## 2023-01-25 VITALS — HEIGHT: 68 IN | BODY MASS INDEX: 30.31 KG/M2 | WEIGHT: 200 LBS

## 2023-01-25 DIAGNOSIS — S93.491D SPRAIN OF OTHER LIGAMENT OF RIGHT ANKLE, SUBSEQUENT ENCOUNTER: ICD-10-CM

## 2023-01-25 PROCEDURE — 99072 ADDL SUPL MATRL&STAF TM PHE: CPT

## 2023-01-25 PROCEDURE — 99214 OFFICE O/P EST MOD 30 MIN: CPT

## 2023-01-25 PROCEDURE — 73610 X-RAY EXAM OF ANKLE: CPT | Mod: RT

## 2023-01-25 NOTE — ASSESSMENT
[FreeTextEntry1] : wbat\par HEP\par nsaids prn\par working full duty\par MRI to eval new onset pain/crepitus\par f/up after MRI
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
## 2023-01-25 NOTE — PHYSICAL EXAM
[Right] : right foot and ankle [NL (40)] : plantar flexion 40 degrees [NL 30)] : inversion 30 degrees [NL (20)] : eversion 20 degrees [5___] : eversion 5[unfilled]/5 [2+] : dorsalis pedis pulse: 2+ [] : non-antalgic [FreeTextEntry8] : improved [de-identified] : crepitus on DF [TWNoteComboBox7] : dorsiflexion 15 degrees

## 2023-01-30 ENCOUNTER — APPOINTMENT (OUTPATIENT)
Dept: SURGERY | Facility: CLINIC | Age: 33
End: 2023-01-30
Payer: COMMERCIAL

## 2023-01-30 DIAGNOSIS — R22.41 LOCALIZED SWELLING, MASS AND LUMP, RIGHT LOWER LIMB: ICD-10-CM

## 2023-01-30 DIAGNOSIS — R22.2 LOCALIZED SWELLING, MASS AND LUMP, TRUNK: ICD-10-CM

## 2023-01-30 PROCEDURE — 99024 POSTOP FOLLOW-UP VISIT: CPT

## 2023-02-08 ENCOUNTER — FORM ENCOUNTER (OUTPATIENT)
Age: 33
End: 2023-02-08

## 2023-02-09 ENCOUNTER — APPOINTMENT (OUTPATIENT)
Dept: MRI IMAGING | Facility: CLINIC | Age: 33
End: 2023-02-09
Payer: OTHER MISCELLANEOUS

## 2023-02-09 PROCEDURE — 73721 MRI JNT OF LWR EXTRE W/O DYE: CPT | Mod: RT

## 2023-02-09 PROCEDURE — 99072 ADDL SUPL MATRL&STAF TM PHE: CPT

## 2023-02-14 ENCOUNTER — APPOINTMENT (OUTPATIENT)
Dept: ORTHOPEDIC SURGERY | Facility: CLINIC | Age: 33
End: 2023-02-14
Payer: OTHER MISCELLANEOUS

## 2023-02-14 VITALS — HEIGHT: 68 IN | BODY MASS INDEX: 30.31 KG/M2 | WEIGHT: 200 LBS

## 2023-02-14 PROCEDURE — 99072 ADDL SUPL MATRL&STAF TM PHE: CPT

## 2023-02-14 PROCEDURE — 99214 OFFICE O/P EST MOD 30 MIN: CPT

## 2023-02-14 NOTE — DATA REVIEWED
[MRI] : MRI [Ankle] : ankle [I reviewed the films/CD and additionally noted] : I reviewed the films/CD and additionally noted [FreeTextEntry1] : syndesmotic impingement; chronic peroneal tendinopathy
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
## 2023-02-14 NOTE — ASSESSMENT
[FreeTextEntry1] : wbat\par HEP\par nsaids prn\par working full duty\par declined csi. discussed arthroscopy if fails to improve. would like to hold off on further treatment at this time

## 2023-02-14 NOTE — PHYSICAL EXAM
[Right] : right foot and ankle [NL (40)] : plantar flexion 40 degrees [NL 30)] : inversion 30 degrees [NL (20)] : eversion 20 degrees [5___] : eversion 5[unfilled]/5 [2+] : dorsalis pedis pulse: 2+ [] : non-antalgic [FreeTextEntry8] : improved [de-identified] : crepitus on DF [TWNoteComboBox7] : dorsiflexion 15 degrees

## 2023-02-17 ENCOUNTER — APPOINTMENT (OUTPATIENT)
Dept: ORTHOPEDIC SURGERY | Facility: CLINIC | Age: 33
End: 2023-02-17
Payer: OTHER MISCELLANEOUS

## 2023-02-17 DIAGNOSIS — M75.51 BURSITIS OF RIGHT SHOULDER: ICD-10-CM

## 2023-02-17 DIAGNOSIS — S43.431A SUPERIOR GLENOID LABRUM LESION OF RIGHT SHOULDER, INITIAL ENCOUNTER: ICD-10-CM

## 2023-02-17 DIAGNOSIS — M75.41 IMPINGEMENT SYNDROME OF RIGHT SHOULDER: ICD-10-CM

## 2023-02-17 PROCEDURE — 99455 WORK RELATED DISABILITY EXAM: CPT

## 2023-02-17 PROCEDURE — 99072 ADDL SUPL MATRL&STAF TM PHE: CPT
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
## 2023-02-17 NOTE — PHYSICAL EXAM
[de-identified] : Constitutional: The general appearance of the patient is well developed, well nourished, no deformities and well groomed. Normal \par \par Gait: Gait and function is as follows: normal gait. \par \par Skin: Head and neck visualized skin is normal. Left upper extremity visualized skin is normal. Right upper extremity visualized skin is normal. Thoracic Skin of the thoracic spine shows visualized skin is normal. \par \par Cardiovascular: palpable radial pulse bilaterally, good capillary refill in digits of the bilateral upper extremities and no temperature or color changes in the bilateral upper extremities. \par \par Lymphatic: Normal Palpation of lymph nodes in the cervical. \par \par Neurologic: fine motor control in the bilateral upper extremities is intact. Deep Tendon Reflexes in Upper and Lower Extremities Negative Kelsey's in the bilateral upper extremities. The patient is oriented to time, place and person. Sensation to light touch intact in the bilateral upper extremities. Mood and Affect is normal. \par \par Right Shoulder: Inspection of the shoulder/upper arm is as follows: no scapula winging, no biceps deformity and no AC joint deformity. Palpation of the shoulder/upper arm is as follows: There is tenderness at the proximal biceps tendon. Range of motion of the shoulder is as follows: Pain with internal rotation, external rotation, abduction and forward flexion. Strength of the shoulder is as follows: Supraspinatus 4/5. External Rotation 4/5. Internal Rotation 4/5. Deltoid 5/5 Ligament Stability and Special Tests of the shoulder is as follows: Neer test is positive. Shannon' test is positive. Speed's test is positive. \par \par Left Shoulder: Inspection of the shoulder/upper arm is as follows: There is a full, pain-free, stable range of motion of the shoulder with normal strength and no tenderness to palpation. \par \par Neck: \par Inspection / Palpation of the cervical spine is as follows: There is a full, pain free, stable range of motion of the cervical spine with normal tone and no tenderness to palpation. \par \par Back, including spine: Inspection / Palpation of the thoracic/lumbar spine is as follows: There is a full, pain free, stable range of motion of the thoracic spine with a normal tone and not tenderness to palpation..\par

## 2023-02-17 NOTE — WORK
[Has the patient reached Maximum Medical Improvement? If yes, indicate date___] : Yes, on [unfilled] [FreeTextEntry6] : Right shoulder 20% SLU
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
## 2023-02-22 ENCOUNTER — APPOINTMENT (OUTPATIENT)
Dept: ORTHOPEDIC SURGERY | Facility: CLINIC | Age: 33
End: 2023-02-22

## 2023-03-01 ENCOUNTER — APPOINTMENT (OUTPATIENT)
Dept: ORTHOPEDIC SURGERY | Facility: CLINIC | Age: 33
End: 2023-03-01
Payer: OTHER MISCELLANEOUS

## 2023-03-01 VITALS — HEIGHT: 68 IN | BODY MASS INDEX: 30.31 KG/M2 | WEIGHT: 200 LBS

## 2023-03-01 PROCEDURE — 99455 WORK RELATED DISABILITY EXAM: CPT

## 2023-03-01 PROCEDURE — 99072 ADDL SUPL MATRL&STAF TM PHE: CPT

## 2023-03-01 NOTE — WORK
[Has the patient reached Maximum Medical Improvement? If yes, indicate date___] : Yes, on [unfilled] [Is there permanent partial impairment?] : Yes [Right] : right [FreeTextEntry7] : ankle [FreeTextEntry5] : 7.5
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
## 2023-03-01 NOTE — PHYSICAL EXAM
[Right] : right foot and ankle [NL (40)] : plantar flexion 40 degrees [NL 30)] : inversion 30 degrees [NL (20)] : eversion 20 degrees [5___] : eversion 5[unfilled]/5 [2+] : dorsalis pedis pulse: 2+ [] : non-antalgic [FreeTextEntry8] : improved [de-identified] : crepitus on DF [TWNoteComboBox7] : dorsiflexion 15 degrees

## 2023-06-01 ENCOUNTER — APPOINTMENT (OUTPATIENT)
Dept: ORTHOPEDIC SURGERY | Facility: CLINIC | Age: 33
End: 2023-06-01
Payer: OTHER MISCELLANEOUS

## 2023-06-01 DIAGNOSIS — M25.871 OTHER SPECIFIED JOINT DISORDERS, RIGHT ANKLE AND FOOT: ICD-10-CM

## 2023-06-01 PROCEDURE — 99075 MEDICAL TESTIMONY: CPT

## 2023-07-31 ENCOUNTER — APPOINTMENT (OUTPATIENT)
Dept: ORTHOPEDIC SURGERY | Facility: CLINIC | Age: 33
End: 2023-07-31

## 2023-08-07 ENCOUNTER — APPOINTMENT (OUTPATIENT)
Dept: SURGERY | Facility: CLINIC | Age: 33
End: 2023-08-07
Payer: COMMERCIAL

## 2023-08-07 VITALS
HEIGHT: 68 IN | DIASTOLIC BLOOD PRESSURE: 70 MMHG | WEIGHT: 176 LBS | OXYGEN SATURATION: 98 % | SYSTOLIC BLOOD PRESSURE: 115 MMHG | HEART RATE: 86 BPM | BODY MASS INDEX: 26.67 KG/M2 | TEMPERATURE: 98.1 F

## 2023-08-07 DIAGNOSIS — R22.2 LOCALIZED SWELLING, MASS AND LUMP, TRUNK: ICD-10-CM

## 2023-08-07 PROCEDURE — 99214 OFFICE O/P EST MOD 30 MIN: CPT

## 2025-04-23 ENCOUNTER — APPOINTMENT (OUTPATIENT)
Dept: ORTHOPEDIC SURGERY | Facility: CLINIC | Age: 35
End: 2025-04-23
Payer: OTHER MISCELLANEOUS

## 2025-04-23 DIAGNOSIS — M75.41 IMPINGEMENT SYNDROME OF RIGHT SHOULDER: ICD-10-CM

## 2025-04-23 DIAGNOSIS — M75.52 BURSITIS OF LEFT SHOULDER: ICD-10-CM

## 2025-04-23 DIAGNOSIS — M75.42 IMPINGEMENT SYNDROME OF LEFT SHOULDER: ICD-10-CM

## 2025-04-23 DIAGNOSIS — M75.51 BURSITIS OF RIGHT SHOULDER: ICD-10-CM

## 2025-04-23 DIAGNOSIS — S43.431A SUPERIOR GLENOID LABRUM LESION OF RIGHT SHOULDER, INITIAL ENCOUNTER: ICD-10-CM

## 2025-04-23 PROCEDURE — 99214 OFFICE O/P EST MOD 30 MIN: CPT

## 2025-04-23 PROCEDURE — 73010 X-RAY EXAM OF SHOULDER BLADE: CPT | Mod: 50

## 2025-04-23 PROCEDURE — 73030 X-RAY EXAM OF SHOULDER: CPT | Mod: 50

## 2025-04-23 RX ORDER — METHYLPREDNISOLONE 4 MG/1
4 TABLET ORAL
Qty: 1 | Refills: 0 | Status: ACTIVE | COMMUNITY
Start: 2025-04-23 | End: 1900-01-01

## 2025-05-02 ENCOUNTER — APPOINTMENT (OUTPATIENT)
Dept: MRI IMAGING | Facility: CLINIC | Age: 35
End: 2025-05-02
Payer: OTHER MISCELLANEOUS

## 2025-05-02 ENCOUNTER — APPOINTMENT (OUTPATIENT)
Dept: ORTHOPEDIC SURGERY | Facility: CLINIC | Age: 35
End: 2025-05-02

## 2025-05-02 PROCEDURE — 73221 MRI JOINT UPR EXTREM W/O DYE: CPT | Mod: RT

## 2025-05-02 PROCEDURE — 73221 MRI JOINT UPR EXTREM W/O DYE: CPT | Mod: LT

## 2025-05-07 ENCOUNTER — APPOINTMENT (OUTPATIENT)
Dept: ORTHOPEDIC SURGERY | Facility: CLINIC | Age: 35
End: 2025-05-07
Payer: OTHER MISCELLANEOUS

## 2025-05-07 ENCOUNTER — TRANSCRIPTION ENCOUNTER (OUTPATIENT)
Age: 35
End: 2025-05-07

## 2025-05-07 DIAGNOSIS — M75.42 IMPINGEMENT SYNDROME OF LEFT SHOULDER: ICD-10-CM

## 2025-05-07 DIAGNOSIS — M75.51 BURSITIS OF RIGHT SHOULDER: ICD-10-CM

## 2025-05-07 DIAGNOSIS — M75.22 BICIPITAL TENDINITIS, LEFT SHOULDER: ICD-10-CM

## 2025-05-07 DIAGNOSIS — M75.41 IMPINGEMENT SYNDROME OF RIGHT SHOULDER: ICD-10-CM

## 2025-05-07 DIAGNOSIS — M75.52 BURSITIS OF LEFT SHOULDER: ICD-10-CM

## 2025-05-07 DIAGNOSIS — M75.21 BICIPITAL TENDINITIS, RIGHT SHOULDER: ICD-10-CM

## 2025-05-07 PROCEDURE — 99214 OFFICE O/P EST MOD 30 MIN: CPT | Mod: 25,57

## 2025-05-07 PROCEDURE — 20611 DRAIN/INJ JOINT/BURSA W/US: CPT | Mod: LT

## 2025-06-18 ENCOUNTER — APPOINTMENT (OUTPATIENT)
Dept: ORTHOPEDIC SURGERY | Facility: CLINIC | Age: 35
End: 2025-06-18
Payer: OTHER MISCELLANEOUS

## 2025-06-18 PROCEDURE — 99214 OFFICE O/P EST MOD 30 MIN: CPT

## 2025-07-10 ENCOUNTER — APPOINTMENT (OUTPATIENT)
Dept: SURGERY | Facility: CLINIC | Age: 35
End: 2025-07-10
Payer: COMMERCIAL

## 2025-07-10 VITALS
SYSTOLIC BLOOD PRESSURE: 128 MMHG | DIASTOLIC BLOOD PRESSURE: 90 MMHG | RESPIRATION RATE: 14 BRPM | HEIGHT: 68 IN | WEIGHT: 179 LBS | BODY MASS INDEX: 27.13 KG/M2 | HEART RATE: 88 BPM | TEMPERATURE: 97.9 F | OXYGEN SATURATION: 97 %

## 2025-07-10 PROBLEM — R19.03 ABDOMINAL MASS, RLQ (RIGHT LOWER QUADRANT): Status: ACTIVE | Noted: 2025-07-10

## 2025-07-10 PROCEDURE — 99215 OFFICE O/P EST HI 40 MIN: CPT

## 2025-07-28 ENCOUNTER — OUTPATIENT (OUTPATIENT)
Dept: OUTPATIENT SERVICES | Facility: HOSPITAL | Age: 35
LOS: 1 days | End: 2025-07-28
Payer: COMMERCIAL

## 2025-07-28 VITALS
HEIGHT: 68 IN | HEART RATE: 69 BPM | SYSTOLIC BLOOD PRESSURE: 126 MMHG | DIASTOLIC BLOOD PRESSURE: 83 MMHG | OXYGEN SATURATION: 97 % | WEIGHT: 179.9 LBS | RESPIRATION RATE: 16 BRPM | TEMPERATURE: 98 F

## 2025-07-28 DIAGNOSIS — R22.2 LOCALIZED SWELLING, MASS AND LUMP, TRUNK: ICD-10-CM

## 2025-07-28 DIAGNOSIS — R19.03 RIGHT LOWER QUADRANT ABDOMINAL SWELLING, MASS AND LUMP: ICD-10-CM

## 2025-07-28 DIAGNOSIS — Z01.818 ENCOUNTER FOR OTHER PREPROCEDURAL EXAMINATION: ICD-10-CM

## 2025-07-28 DIAGNOSIS — Z98.890 OTHER SPECIFIED POSTPROCEDURAL STATES: Chronic | ICD-10-CM

## 2025-07-28 DIAGNOSIS — Z90.89 ACQUIRED ABSENCE OF OTHER ORGANS: Chronic | ICD-10-CM

## 2025-07-28 LAB
HCT VFR BLD CALC: 47.2 % — SIGNIFICANT CHANGE UP (ref 39–50)
HGB BLD-MCNC: 15.9 G/DL — SIGNIFICANT CHANGE UP (ref 13–17)
MCHC RBC-ENTMCNC: 28.1 PG — SIGNIFICANT CHANGE UP (ref 27–34)
MCHC RBC-ENTMCNC: 33.7 G/DL — SIGNIFICANT CHANGE UP (ref 32–36)
MCV RBC AUTO: 83.4 FL — SIGNIFICANT CHANGE UP (ref 80–100)
NRBC # BLD AUTO: 0 K/UL — SIGNIFICANT CHANGE UP (ref 0–0)
NRBC # FLD: 0 K/UL — SIGNIFICANT CHANGE UP (ref 0–0)
NRBC BLD AUTO-RTO: 0 /100 WBCS — SIGNIFICANT CHANGE UP (ref 0–0)
PLATELET # BLD AUTO: 181 K/UL — SIGNIFICANT CHANGE UP (ref 150–400)
PMV BLD: 11.1 FL — SIGNIFICANT CHANGE UP (ref 7–13)
RBC # BLD: 5.66 M/UL — SIGNIFICANT CHANGE UP (ref 4.2–5.8)
RBC # FLD: 15 % — HIGH (ref 10.3–14.5)
WBC # BLD: 5.85 K/UL — SIGNIFICANT CHANGE UP (ref 3.8–10.5)
WBC # FLD AUTO: 5.85 K/UL — SIGNIFICANT CHANGE UP (ref 3.8–10.5)

## 2025-07-28 PROCEDURE — 36415 COLL VENOUS BLD VENIPUNCTURE: CPT

## 2025-07-28 PROCEDURE — G0463: CPT

## 2025-07-28 PROCEDURE — 85027 COMPLETE CBC AUTOMATED: CPT

## 2025-07-30 ENCOUNTER — APPOINTMENT (OUTPATIENT)
Dept: ORTHOPEDIC SURGERY | Facility: CLINIC | Age: 35
End: 2025-07-30

## 2025-08-20 ENCOUNTER — APPOINTMENT (OUTPATIENT)
Dept: SURGERY | Facility: HOSPITAL | Age: 35
End: 2025-08-20

## 2025-08-20 ENCOUNTER — RESULT REVIEW (OUTPATIENT)
Age: 35
End: 2025-08-20

## 2025-08-20 ENCOUNTER — TRANSCRIPTION ENCOUNTER (OUTPATIENT)
Age: 35
End: 2025-08-20

## 2025-08-20 ENCOUNTER — OUTPATIENT (OUTPATIENT)
Dept: OUTPATIENT SERVICES | Facility: HOSPITAL | Age: 35
LOS: 1 days | End: 2025-08-20
Payer: COMMERCIAL

## 2025-08-20 VITALS
HEART RATE: 68 BPM | OXYGEN SATURATION: 98 % | RESPIRATION RATE: 15 BRPM | SYSTOLIC BLOOD PRESSURE: 120 MMHG | DIASTOLIC BLOOD PRESSURE: 70 MMHG

## 2025-08-20 VITALS
SYSTOLIC BLOOD PRESSURE: 124 MMHG | HEART RATE: 74 BPM | OXYGEN SATURATION: 97 % | DIASTOLIC BLOOD PRESSURE: 82 MMHG | RESPIRATION RATE: 14 BRPM | TEMPERATURE: 97 F

## 2025-08-20 DIAGNOSIS — Z98.890 OTHER SPECIFIED POSTPROCEDURAL STATES: Chronic | ICD-10-CM

## 2025-08-20 DIAGNOSIS — Z90.89 ACQUIRED ABSENCE OF OTHER ORGANS: Chronic | ICD-10-CM

## 2025-08-20 DIAGNOSIS — R22.2 LOCALIZED SWELLING, MASS AND LUMP, TRUNK: ICD-10-CM

## 2025-08-20 DIAGNOSIS — R19.03 RIGHT LOWER QUADRANT ABDOMINAL SWELLING, MASS AND LUMP: ICD-10-CM

## 2025-08-20 PROCEDURE — ZZZZZ: CPT

## 2025-08-20 PROCEDURE — 22901 EXC ABDL TUM DEEP 5 CM/>: CPT

## 2025-08-20 PROCEDURE — 88305 TISSUE EXAM BY PATHOLOGIST: CPT | Mod: 26

## 2025-08-20 PROCEDURE — 21554 EXC NECK TUM DEEP 5 CM/>: CPT

## 2025-08-20 PROCEDURE — 21554 EXC NECK TUM DEEP 5 CM/>: CPT | Mod: LT

## 2025-08-20 PROCEDURE — 22900 EXC ABDL TUM DEEP < 5 CM: CPT | Mod: XS

## 2025-08-20 PROCEDURE — 21556 EXC NECK TUM DEEP < 5 CM: CPT | Mod: RT

## 2025-08-20 PROCEDURE — 21556 EXC NECK TUM DEEP < 5 CM: CPT | Mod: XS

## 2025-08-20 PROCEDURE — 88305 TISSUE EXAM BY PATHOLOGIST: CPT

## 2025-08-20 PROCEDURE — 22901 EXC ABDL TUM DEEP 5 CM/>: CPT | Mod: XS

## 2025-08-20 RX ORDER — CEFAZOLIN SODIUM IN 0.9 % NACL 3 G/100 ML
1000 INTRAVENOUS SOLUTION, PIGGYBACK (ML) INTRAVENOUS ONCE
Refills: 0 | Status: COMPLETED | OUTPATIENT
Start: 2025-08-20 | End: 2025-08-20

## 2025-08-20 RX ORDER — ACETAMINOPHEN 500 MG/5ML
2 LIQUID (ML) ORAL
Qty: 30 | Refills: 0
Start: 2025-08-20

## 2025-08-20 RX ORDER — METOCLOPRAMIDE HCL 10 MG
5 TABLET ORAL ONCE
Refills: 0 | Status: DISCONTINUED | OUTPATIENT
Start: 2025-08-20 | End: 2025-08-20

## 2025-08-20 RX ORDER — OXYCODONE HYDROCHLORIDE 30 MG/1
5 TABLET ORAL ONCE
Refills: 0 | Status: DISCONTINUED | OUTPATIENT
Start: 2025-08-20 | End: 2025-08-20

## 2025-08-20 RX ORDER — SODIUM CHLORIDE 9 G/1000ML
1000 INJECTION, SOLUTION INTRAVENOUS
Refills: 0 | Status: DISCONTINUED | OUTPATIENT
Start: 2025-08-20 | End: 2025-08-20

## 2025-08-20 RX ORDER — HYDROMORPHONE/SOD CHLOR,ISO/PF 2 MG/10 ML
0.5 SYRINGE (ML) INJECTION
Refills: 0 | Status: DISCONTINUED | OUTPATIENT
Start: 2025-08-20 | End: 2025-08-20

## 2025-08-20 RX ORDER — IBUPROFEN 200 MG
1 TABLET ORAL
Qty: 30 | Refills: 0
Start: 2025-08-20

## 2025-08-20 RX ADMIN — SODIUM CHLORIDE 120 MILLILITER(S): 9 INJECTION, SOLUTION INTRAVENOUS at 10:46

## 2025-08-29 LAB — SURGICAL PATHOLOGY STUDY: SIGNIFICANT CHANGE UP

## 2025-09-04 ENCOUNTER — APPOINTMENT (OUTPATIENT)
Dept: SURGERY | Facility: CLINIC | Age: 35
End: 2025-09-04
Payer: COMMERCIAL

## 2025-09-04 DIAGNOSIS — R22.2 LOCALIZED SWELLING, MASS AND LUMP, TRUNK: ICD-10-CM

## 2025-09-04 PROCEDURE — 99024 POSTOP FOLLOW-UP VISIT: CPT

## (undated) DEVICE — SUCTION YANKAUER NO CONTROL VENT

## (undated) DEVICE — SOL IRR POUR NS 0.9% 1000ML

## (undated) DEVICE — WARMING BLANKET UPPER ADULT

## (undated) DEVICE — ELCTR BOVIE TIP BLADE INSULATED 2.75" EDGE

## (undated) DEVICE — GOWN LG

## (undated) DEVICE — SUT POLYSORB 3-0 30" V-20 UNDYED

## (undated) DEVICE — POSITIONER CUSHION INSERT PRONE VIEW LG

## (undated) DEVICE — VENODYNE/SCD SLEEVE CALF MEDIUM

## (undated) DEVICE — PLV-SCD MACHINE: Type: DURABLE MEDICAL EQUIPMENT

## (undated) DEVICE — DRAPE 3/4 SHEET W REINFORCEMENT 56X77"

## (undated) DEVICE — SUT POLYSORB 4-0 18" P-12 UNDYED

## (undated) DEVICE — VENODYNE/SCD SLEEVE CALF LARGE

## (undated) DEVICE — PACK MINOR WITH LAP

## (undated) DEVICE — Device

## (undated) DEVICE — DRAPE TOWEL BLUE 17" X 24"

## (undated) DEVICE — GLV 8 PROTEXIS (BLUE)

## (undated) DEVICE — SUT POLYSORB 2-0 30" V-20 UNDYED

## (undated) DEVICE — WARMING BLANKET LOWER ADULT

## (undated) DEVICE — GLV 7.5 PROTEXIS (WHITE)